# Patient Record
Sex: FEMALE | Race: WHITE | URBAN - NONMETROPOLITAN AREA
[De-identification: names, ages, dates, MRNs, and addresses within clinical notes are randomized per-mention and may not be internally consistent; named-entity substitution may affect disease eponyms.]

---

## 2020-11-05 ENCOUNTER — APPOINTMENT (RX ONLY)
Dept: URBAN - NONMETROPOLITAN AREA CLINIC 1 | Facility: CLINIC | Age: 35
Setting detail: DERMATOLOGY
End: 2020-11-05

## 2020-11-05 VITALS — DIASTOLIC BLOOD PRESSURE: 91 MMHG | SYSTOLIC BLOOD PRESSURE: 117 MMHG

## 2020-11-05 VITALS — TEMPERATURE: 98.3 F

## 2020-11-05 DIAGNOSIS — L70.8 OTHER ACNE: ICD-10-CM

## 2020-11-05 DIAGNOSIS — Z12.83 ENCOUNTER FOR SCREENING FOR MALIGNANT NEOPLASM OF SKIN: ICD-10-CM

## 2020-11-05 DIAGNOSIS — L57.8 OTHER SKIN CHANGES DUE TO CHRONIC EXPOSURE TO NONIONIZING RADIATION: ICD-10-CM

## 2020-11-05 DIAGNOSIS — Z41.9 ENCOUNTER FOR PROCEDURE FOR PURPOSES OTHER THAN REMEDYING HEALTH STATE, UNSPECIFIED: ICD-10-CM

## 2020-11-05 PROCEDURE — ? COSMETIC CONSULTATION: CHEMICAL PEELS

## 2020-11-05 PROCEDURE — ? PRESCRIPTION

## 2020-11-05 PROCEDURE — ? COUNSELING

## 2020-11-05 PROCEDURE — 99203 OFFICE O/P NEW LOW 30 MIN: CPT

## 2020-11-05 RX ORDER — SPIRONOLACTONE 25 MG/1
TABLET, FILM COATED ORAL QD
Qty: 60 | Refills: 3 | Status: ERX | COMMUNITY
Start: 2020-11-05

## 2020-11-05 RX ADMIN — SPIRONOLACTONE 1: 25 TABLET, FILM COATED ORAL at 00:00

## 2020-11-05 ASSESSMENT — LOCATION ZONE DERM
LOCATION ZONE: TRUNK
LOCATION ZONE: FACE

## 2020-11-05 ASSESSMENT — LOCATION DETAILED DESCRIPTION DERM
LOCATION DETAILED: RIGHT INFERIOR CENTRAL MALAR CHEEK
LOCATION DETAILED: RIGHT SUPERIOR MEDIAL UPPER BACK

## 2020-11-05 ASSESSMENT — LOCATION SIMPLE DESCRIPTION DERM
LOCATION SIMPLE: RIGHT UPPER BACK
LOCATION SIMPLE: RIGHT CHEEK

## 2020-11-05 NOTE — PROCEDURE: COUNSELING
Detail Level: Generalized
Spironolactone Counseling: Patient advised regarding risks of diarrhea, abdominal pain, hyperkalemia, birth defects (for female patients), liver toxicity and renal toxicity. The patient may need blood work to monitor liver and kidney function and potassium levels while on therapy. The patient verbalized understanding of the proper use and possible adverse effects of spironolactone.  All of the patient's questions and concerns were addressed.
High Dose Vitamin A Pregnancy And Lactation Text: High dose vitamin A therapy is contraindicated during pregnancy and breast feeding.
Tazorac Pregnancy And Lactation Text: This medication is not safe during pregnancy. It is unknown if this medication is excreted in breast milk.
Erythromycin Pregnancy And Lactation Text: This medication is Pregnancy Category B and is considered safe during pregnancy. It is also excreted in breast milk.
Dapsone Pregnancy And Lactation Text: This medication is Pregnancy Category C and is not considered safe during pregnancy or breast feeding.
Bactrim Counseling:  I discussed with the patient the risks of sulfa antibiotics including but not limited to GI upset, allergic reaction, drug rash, diarrhea, dizziness, photosensitivity, and yeast infections.  Rarely, more serious reactions can occur including but not limited to aplastic anemia, agranulocytosis, methemoglobinemia, blood dyscrasias, liver or kidney failure, lung infiltrates or desquamative/blistering drug rashes.
Minocycline Counseling: Patient advised regarding possible photosensitivity and discoloration of the teeth, skin, lips, tongue and gums.  Patient instructed to avoid sunlight, if possible.  When exposed to sunlight, patients should wear protective clothing, sunglasses, and sunscreen.  The patient was instructed to call the office immediately if the following severe adverse effects occur:  hearing changes, easy bruising/bleeding, severe headache, or vision changes.  The patient verbalized understanding of the proper use and possible adverse effects of minocycline.  All of the patient's questions and concerns were addressed.
Benzoyl Peroxide Pregnancy And Lactation Text: This medication is Pregnancy Category C. It is unknown if benzoyl peroxide is excreted in breast milk.
Isotretinoin Counseling: Patient should get monthly blood tests, not donate blood, not drive at night if vision affected, not share medication, and not undergo elective surgery for 6 months after tx completed. Side effects reviewed, pt to contact office should one occur.
Spironolactone Pregnancy And Lactation Text: This medication can cause feminization of the male fetus and should be avoided during pregnancy. The active metabolite is also found in breast milk.
Use Enhanced Medication Counseling?: No
Bactrim Pregnancy And Lactation Text: This medication is Pregnancy Category D and is known to cause fetal risk.  It is also excreted in breast milk.
Topical Clindamycin Counseling: Patient counseled that this medication may cause skin irritation or allergic reactions.  In the event of skin irritation, the patient was advised to reduce the amount of the drug applied or use it less frequently.   The patient verbalized understanding of the proper use and possible adverse effects of clindamycin.  All of the patient's questions and concerns were addressed.
Minocycline Pregnancy And Lactation Text: This medication is Pregnancy Category D and not consider safe during pregnancy. It is also excreted in breast milk.
Topical Retinoid counseling:  Patient advised to apply a pea-sized amount only at bedtime and wait 30 minutes after washing their face before applying.  If too drying, patient may add a non-comedogenic moisturizer. The patient verbalized understanding of the proper use and possible adverse effects of retinoids.  All of the patient's questions and concerns were addressed.
Doxycycline Counseling:  Patient counseled regarding possible photosensitivity and increased risk for sunburn.  Patient instructed to avoid sunlight, if possible.  When exposed to sunlight, patients should wear protective clothing, sunglasses, and sunscreen.  The patient was instructed to call the office immediately if the following severe adverse effects occur:  hearing changes, easy bruising/bleeding, severe headache, or vision changes.  The patient verbalized understanding of the proper use and possible adverse effects of doxycycline.  All of the patient's questions and concerns were addressed.
Birth Control Pills Counseling: Birth Control Pill Counseling: I discussed with the patient the potential side effects of OCPs including but not limited to increased risk of stroke, heart attack, thrombophlebitis, deep venous thrombosis, hepatic adenomas, breast changes, GI upset, headaches, and depression.  The patient verbalized understanding of the proper use and possible adverse effects of OCPs. All of the patient's questions and concerns were addressed.
Topical Clindamycin Pregnancy And Lactation Text: This medication is Pregnancy Category B and is considered safe during pregnancy. It is unknown if it is excreted in breast milk.
Detail Level: Zone
Isotretinoin Pregnancy And Lactation Text: This medication is Pregnancy Category X and is considered extremely dangerous during pregnancy. It is unknown if it is excreted in breast milk.
Azithromycin Counseling:  I discussed with the patient the risks of azithromycin including but not limited to GI upset, allergic reaction, drug rash, diarrhea, and yeast infections.
Topical Retinoid Pregnancy And Lactation Text: This medication is Pregnancy Category C. It is unknown if this medication is excreted in breast milk.
Tetracycline Counseling: Patient counseled regarding possible photosensitivity and increased risk for sunburn.  Patient instructed to avoid sunlight, if possible.  When exposed to sunlight, patients should wear protective clothing, sunglasses, and sunscreen.  The patient was instructed to call the office immediately if the following severe adverse effects occur:  hearing changes, easy bruising/bleeding, severe headache, or vision changes.  The patient verbalized understanding of the proper use and possible adverse effects of tetracycline.  All of the patient's questions and concerns were addressed. Patient understands to avoid pregnancy while on therapy due to potential birth defects.
Doxycycline Pregnancy And Lactation Text: This medication is Pregnancy Category D and not consider safe during pregnancy. It is also excreted in breast milk but is considered safe for shorter treatment courses.
Sarecycline Counseling: Patient advised regarding possible photosensitivity and discoloration of the teeth, skin, lips, tongue and gums.  Patient instructed to avoid sunlight, if possible.  When exposed to sunlight, patients should wear protective clothing, sunglasses, and sunscreen.  The patient was instructed to call the office immediately if the following severe adverse effects occur:  hearing changes, easy bruising/bleeding, severe headache, or vision changes.  The patient verbalized understanding of the proper use and possible adverse effects of sarecycline.  All of the patient's questions and concerns were addressed.
Topical Sulfur Applications Counseling: Topical Sulfur Counseling: Patient counseled that this medication may cause skin irritation or allergic reactions.  In the event of skin irritation, the patient was advised to reduce the amount of the drug applied or use it less frequently.   The patient verbalized understanding of the proper use and possible adverse effects of topical sulfur application.  All of the patient's questions and concerns were addressed.
High Dose Vitamin A Counseling: Side effects reviewed, pt to contact office should one occur.
Tazorac Counseling:  Patient advised that medication is irritating and drying.  Patient may need to apply sparingly and wash off after an hour before eventually leaving it on overnight.  The patient verbalized understanding of the proper use and possible adverse effects of tazorac.  All of the patient's questions and concerns were addressed.
Birth Control Pills Pregnancy And Lactation Text: This medication should be avoided if pregnant and for the first 30 days post-partum.
Azithromycin Pregnancy And Lactation Text: This medication is considered safe during pregnancy and is also secreted in breast milk.
Erythromycin Counseling:  I discussed with the patient the risks of erythromycin including but not limited to GI upset, allergic reaction, drug rash, diarrhea, increase in liver enzymes, and yeast infections.
Benzoyl Peroxide Counseling: Patient counseled that medicine may cause skin irritation and bleach clothing.  In the event of skin irritation, the patient was advised to reduce the amount of the drug applied or use it less frequently.   The patient verbalized understanding of the proper use and possible adverse effects of benzoyl peroxide.  All of the patient's questions and concerns were addressed.
Dapsone Counseling: I discussed with the patient the risks of dapsone including but not limited to hemolytic anemia, agranulocytosis, rashes, methemoglobinemia, kidney failure, peripheral neuropathy, headaches, GI upset, and liver toxicity.  Patients who start dapsone require monitoring including baseline LFTs and weekly CBCs for the first month, then every month thereafter.  The patient verbalized understanding of the proper use and possible adverse effects of dapsone.  All of the patient's questions and concerns were addressed.
Topical Sulfur Applications Pregnancy And Lactation Text: This medication is Pregnancy Category C and has an unknown safety profile during pregnancy. It is unknown if this topical medication is excreted in breast milk.
Patient Specific Counseling (Will Not Stick From Patient To Patient): Advised consultation with Brenna Carter RN.

## 2020-12-08 ENCOUNTER — APPOINTMENT (RX ONLY)
Dept: URBAN - NONMETROPOLITAN AREA CLINIC 1 | Facility: CLINIC | Age: 35
Setting detail: DERMATOLOGY
End: 2020-12-08

## 2020-12-08 VITALS — TEMPERATURE: 97.5 F

## 2020-12-08 DIAGNOSIS — Z41.9 ENCOUNTER FOR PROCEDURE FOR PURPOSES OTHER THAN REMEDYING HEALTH STATE, UNSPECIFIED: ICD-10-CM

## 2020-12-08 PROCEDURE — ? CHEMICAL PEEL

## 2020-12-08 ASSESSMENT — LOCATION DETAILED DESCRIPTION DERM: LOCATION DETAILED: GLABELLA

## 2020-12-08 ASSESSMENT — LOCATION SIMPLE DESCRIPTION DERM: LOCATION SIMPLE: GLABELLA

## 2020-12-08 ASSESSMENT — LOCATION ZONE DERM: LOCATION ZONE: FACE

## 2020-12-08 NOTE — PROCEDURE: CHEMICAL PEEL
Consent: Prior to the procedure, photos were taken, written consent was obtained, and RN reviewed the risks with the pt, including but not limited to: redness, peeling, blistering, pigmentary change, scarring, infection, and pain.  All pts questions and concerns addressed and pt verbalized understanding.
Number Of Layers: 4
Chemical Peel: Sensi Peel
Post Peel Care: After the procedure, Total strength, rebalance as final steps, pt refused sunscreen.  Pt provided after care kit and instructions and verbalized understanding.
Price (Use Numbers Only, No Special Characters Or $): 160
Comments: Pt swished with antiseptic mouthwash for 30 seconds prior to treatment.
Treatment Time (Optional): 45 mins
Detail Level: Zone
Prep: The treated area was degreased with oily/problem facial wash and smoothing toner. Pt verbalized sensitivity level of smoothing toner at 2 out of 10.
Post-Care Instructions: I reviewed with the patient in detail post-care instructions. Pt provided with home care kit with written instructions included. Patient should avoid sun exposure and wear sun protection.
Treatment Number: 1

## 2021-04-01 ENCOUNTER — APPOINTMENT (RX ONLY)
Dept: URBAN - NONMETROPOLITAN AREA CLINIC 1 | Facility: CLINIC | Age: 36
Setting detail: DERMATOLOGY
End: 2021-04-01

## 2021-04-01 VITALS — TEMPERATURE: 97.9 F

## 2021-04-01 DIAGNOSIS — Z41.9 ENCOUNTER FOR PROCEDURE FOR PURPOSES OTHER THAN REMEDYING HEALTH STATE, UNSPECIFIED: ICD-10-CM

## 2021-04-01 PROCEDURE — ? MEDICAL CONSULTATION: PRODUCTS

## 2021-04-01 PROCEDURE — ? CHEMICAL PEEL

## 2021-04-01 ASSESSMENT — LOCATION SIMPLE DESCRIPTION DERM: LOCATION SIMPLE: GLABELLA

## 2021-04-01 ASSESSMENT — LOCATION DETAILED DESCRIPTION DERM: LOCATION DETAILED: GLABELLA

## 2021-04-01 ASSESSMENT — LOCATION ZONE DERM: LOCATION ZONE: FACE

## 2021-04-01 NOTE — PROCEDURE: CHEMICAL PEEL
Consent: Prior to the procedure, photos were taken, written consent was obtained, and RN reviewed the risks with the pt, including but not limited to: redness, peeling, blistering, pigmentary change, scarring, infection, and pain.  All pts questions and concerns addressed and pt verbalized understanding.
Number Of Layers: 4
Chemical Peel: PCA Peel with Hydroquinone and Resourcinol
Post Peel Care: After the procedure, Total strength, HABS, HALB, ideal eye gel, advanced tx booster, rebalance as final steps, sheer tint SPF 45. Pt provided after care kit and instructions and verbalized understanding.
Price (Use Numbers Only, No Special Characters Or $): 160
Comments: Pt swished with antiseptic mouthwash for 30 seconds prior to treatment.
Treatment Time (Optional): 45 mins
Detail Level: Zone
Prep: The treated area was degreased with oily/problem facial wash and smoothing toner. Pt verbalized sensitivity level of smoothing toner at 2 out of 10.
Post-Care Instructions: I reviewed with the patient in detail post-care instructions. Pt provided with home care kit with written instructions included. Patient should avoid sun exposure and wear sun protection.
Treatment Number: 2

## 2021-06-01 ENCOUNTER — APPOINTMENT (RX ONLY)
Dept: URBAN - NONMETROPOLITAN AREA CLINIC 1 | Facility: CLINIC | Age: 36
Setting detail: DERMATOLOGY
End: 2021-06-01

## 2021-06-01 DIAGNOSIS — Z41.9 ENCOUNTER FOR PROCEDURE FOR PURPOSES OTHER THAN REMEDYING HEALTH STATE, UNSPECIFIED: ICD-10-CM

## 2021-06-01 PROCEDURE — ? CHEMICAL PEEL

## 2021-06-01 ASSESSMENT — LOCATION ZONE DERM: LOCATION ZONE: FACE

## 2021-06-01 ASSESSMENT — LOCATION DETAILED DESCRIPTION DERM: LOCATION DETAILED: GLABELLA

## 2021-06-01 ASSESSMENT — LOCATION SIMPLE DESCRIPTION DERM: LOCATION SIMPLE: GLABELLA

## 2021-06-01 NOTE — PROCEDURE: CHEMICAL PEEL
Consent: Prior to the procedure, photos were taken, written consent was obtained, and RN reviewed the risks with the pt, including but not limited to: redness, peeling, blistering, pigmentary change, scarring, infection, and pain.  All pts questions and concerns addressed and pt verbalized understanding.
Number Of Layers: 4
Chemical Peel: PCA Peel with Hydroquinone and Resourcinol
Post Peel Care: After the procedure, Ex linea, ILYA, HABS, HALB, ideal eye gel, advanced tx booster, rebalance as final steps, sheer tint SPF 45. Pt provided after care kit and instructions and verbalized understanding.
Price (Use Numbers Only, No Special Characters Or $): 160
Comments: Pt swished with antiseptic mouthwash for 30 seconds prior to treatment.
Treatment Time (Optional): 45 mins
Detail Level: Zone
Prep: The treated area was degreased with oily/problem facial wash and smoothing toner. Pt verbalized sensitivity level of smoothing toner at 2 out of 10.
Post-Care Instructions: I reviewed with the patient in detail post-care instructions. Pt provided with home care kit with written instructions included. Patient should avoid sun exposure and wear sun protection.
Treatment Number: 3

## 2021-09-21 ENCOUNTER — APPOINTMENT (RX ONLY)
Dept: URBAN - NONMETROPOLITAN AREA CLINIC 1 | Facility: CLINIC | Age: 36
Setting detail: DERMATOLOGY
End: 2021-09-21

## 2021-09-21 DIAGNOSIS — Z41.9 ENCOUNTER FOR PROCEDURE FOR PURPOSES OTHER THAN REMEDYING HEALTH STATE, UNSPECIFIED: ICD-10-CM

## 2021-09-21 PROCEDURE — ? CHEMICAL PEEL

## 2021-09-21 ASSESSMENT — LOCATION ZONE DERM: LOCATION ZONE: FACE

## 2021-09-21 ASSESSMENT — LOCATION DETAILED DESCRIPTION DERM: LOCATION DETAILED: GLABELLA

## 2021-09-21 ASSESSMENT — LOCATION SIMPLE DESCRIPTION DERM: LOCATION SIMPLE: GLABELLA

## 2021-09-21 NOTE — PROCEDURE: CHEMICAL PEEL
Consent: Prior to the procedure, photos were taken, written consent was obtained, and RN reviewed the risks with the pt, including but not limited to: redness, peeling, blistering, pigmentary change, scarring, infection, and pain.  All pts questions and concerns addressed and pt verbalized understanding.
Number Of Layers: 4
Chemical Peel: PCA Peel with Hydroquinone and Resourcinol
Post Peel Care: After the procedure, Ex linea, ILYA, HABS, HALB, ideal eye gel, advanced tx booster, rebalance as final steps, sheer tint SPF 45. Pt provided after care kit and instructions and verbalized understanding.
Price (Use Numbers Only, No Special Characters Or $): 160
Comments: Pt swished with antiseptic mouthwash for 30 seconds prior to treatment.
Treatment Time (Optional): 45 mins
Detail Level: Zone
Prep: The treated area was degreased with oily/problem facial wash and smoothing toner. Pt verbalized sensitivity level of smoothing toner at 2 out of 10.
Post-Care Instructions: I reviewed with the patient in detail post-care instructions. Pt provided with home care kit with written instructions included. Patient should avoid sun exposure and wear sun protection.

## 2021-12-16 ENCOUNTER — APPOINTMENT (RX ONLY)
Dept: URBAN - NONMETROPOLITAN AREA CLINIC 1 | Facility: CLINIC | Age: 36
Setting detail: DERMATOLOGY
End: 2021-12-16

## 2021-12-16 DIAGNOSIS — Z41.9 ENCOUNTER FOR PROCEDURE FOR PURPOSES OTHER THAN REMEDYING HEALTH STATE, UNSPECIFIED: ICD-10-CM

## 2021-12-16 PROCEDURE — ? CHEMICAL PEEL

## 2021-12-16 ASSESSMENT — LOCATION SIMPLE DESCRIPTION DERM: LOCATION SIMPLE: GLABELLA

## 2021-12-16 ASSESSMENT — LOCATION DETAILED DESCRIPTION DERM: LOCATION DETAILED: GLABELLA

## 2021-12-16 ASSESSMENT — LOCATION ZONE DERM: LOCATION ZONE: FACE

## 2021-12-16 NOTE — PROCEDURE: CHEMICAL PEEL
Consent: Prior to the procedure, photos were taken, written consent was obtained, and RN reviewed the risks with the pt, including but not limited to: redness, peeling, blistering, pigmentary change, scarring, infection, and pain.  All pts questions and concerns addressed and pt verbalized understanding.
Number Of Layers: 4
Chemical Peel: PCA Peel with Hydroquinone and Resourcinol
Post Peel Care: After the procedure, CALLY CARABALLO, KILLIAN, ideal eye gel, advanced treatment booster, rebalance as final steps, sheer tint SPF 45. Pt provided after care kit and instructions and verbalized understanding.
Price (Use Numbers Only, No Special Characters Or $): 160
Treatment Time (Optional): 45 mins
Detail Level: Zone
Prep: The treated area was degreased with oily/problem facial wash and smoothing toner. Pt verbalized sensitivity level of smoothing toner at 2 out of 10.
Post-Care Instructions: I reviewed with the patient in detail post-care instructions. Pt provided with home care kit with written instructions included. Patient should avoid sun exposure and wear sun protection.
Treatment Number: 5

## 2022-03-17 ENCOUNTER — APPOINTMENT (RX ONLY)
Dept: URBAN - NONMETROPOLITAN AREA CLINIC 1 | Facility: CLINIC | Age: 37
Setting detail: DERMATOLOGY
End: 2022-03-17

## 2022-03-17 DIAGNOSIS — Z41.9 ENCOUNTER FOR PROCEDURE FOR PURPOSES OTHER THAN REMEDYING HEALTH STATE, UNSPECIFIED: ICD-10-CM

## 2022-03-17 PROCEDURE — ? CHEMICAL PEEL

## 2022-03-17 ASSESSMENT — LOCATION DETAILED DESCRIPTION DERM: LOCATION DETAILED: GLABELLA

## 2022-03-17 ASSESSMENT — LOCATION ZONE DERM: LOCATION ZONE: FACE

## 2022-03-17 ASSESSMENT — LOCATION SIMPLE DESCRIPTION DERM: LOCATION SIMPLE: GLABELLA

## 2022-03-17 NOTE — PROCEDURE: CHEMICAL PEEL
Consent: Prior to the procedure, photos were taken, written consent was obtained, and RN reviewed the risks with the pt, including but not limited to: redness, peeling, blistering, pigmentary change, scarring, infection, and pain.  All pts questions and concerns addressed and pt verbalized understanding.
Number Of Layers: 2
Chemical Peel: PCA Peel with Hydroquinone and Resourcinol
Post Peel Care: After the procedure, CALLY CARABALLO, KILLIAN, ideal eye gel, advanced treatment booster, rebalance as final steps, sheer tint SPF 45. Pt provided after care kit and instructions and verbalized understanding.
Price (Use Numbers Only, No Special Characters Or $): 160
Treatment Time (Optional): 45 mins
Detail Level: Zone
Prep: The treated area was degreased with oily/problem facial wash and smoothing toner. Pt verbalized sensitivity level of smoothing toner at 2 out of 10.
Post-Care Instructions: I reviewed with the patient in detail post-care instructions. Pt provided with home care kit with written instructions included. Patient should avoid sun exposure and wear sun protection.

## 2022-05-19 ENCOUNTER — APPOINTMENT (RX ONLY)
Dept: URBAN - NONMETROPOLITAN AREA CLINIC 1 | Facility: CLINIC | Age: 37
Setting detail: DERMATOLOGY
End: 2022-05-19

## 2022-05-19 DIAGNOSIS — Z41.9 ENCOUNTER FOR PROCEDURE FOR PURPOSES OTHER THAN REMEDYING HEALTH STATE, UNSPECIFIED: ICD-10-CM

## 2022-05-19 PROCEDURE — ? CHEMICAL PEEL

## 2022-05-19 ASSESSMENT — LOCATION DETAILED DESCRIPTION DERM: LOCATION DETAILED: GLABELLA

## 2022-05-19 ASSESSMENT — LOCATION SIMPLE DESCRIPTION DERM: LOCATION SIMPLE: GLABELLA

## 2022-05-19 ASSESSMENT — LOCATION ZONE DERM: LOCATION ZONE: FACE

## 2022-05-19 NOTE — PROCEDURE: CHEMICAL PEEL
Consent: Prior to the procedure, photos were taken, written consent was obtained, and RN reviewed the risks with the pt, including but not limited to: redness, peeling, blistering, pigmentary change, scarring, infection, and pain.  All pts questions and concerns addressed and pt verbalized understanding.
Number Of Layers: 4
Chemical Peel: PCA Peel with Hydroquinone and Resourcinol
Post Peel Care: After the procedure HABS, HALB, ideal eye gel, advanced treatment booster, rebalance as final steps, sheer tint SPF 45. Pt provided after care kit and instructions and verbalized understanding.
Price (Use Numbers Only, No Special Characters Or $): 160
Treatment Time (Optional): 45 mins
Detail Level: Zone
Prep: The treated area was degreased with oily/problem facial wash and smoothing toner. Pt verbalized sensitivity level of smoothing toner at 2 out of 10.
Post-Care Instructions: I reviewed with the patient in detail post-care instructions. Pt provided with home care kit with written instructions included. Patient should avoid sun exposure and wear sun protection.
Treatment Number: 7

## 2022-09-15 ENCOUNTER — APPOINTMENT (RX ONLY)
Dept: URBAN - NONMETROPOLITAN AREA CLINIC 1 | Facility: CLINIC | Age: 37
Setting detail: DERMATOLOGY
End: 2022-09-15

## 2022-09-15 DIAGNOSIS — Z41.9 ENCOUNTER FOR PROCEDURE FOR PURPOSES OTHER THAN REMEDYING HEALTH STATE, UNSPECIFIED: ICD-10-CM

## 2022-09-15 PROCEDURE — ? CHEMICAL PEEL

## 2022-09-15 ASSESSMENT — LOCATION DETAILED DESCRIPTION DERM: LOCATION DETAILED: GLABELLA

## 2022-09-15 ASSESSMENT — LOCATION SIMPLE DESCRIPTION DERM: LOCATION SIMPLE: GLABELLA

## 2022-09-15 ASSESSMENT — LOCATION ZONE DERM: LOCATION ZONE: FACE

## 2022-09-15 NOTE — PROCEDURE: CHEMICAL PEEL
Consent: Prior to the procedure, photos were taken, written consent was obtained, and RN reviewed the risks with the pt, including but not limited to: redness, peeling, blistering, pigmentary change, scarring, infection, and pain.  All pts questions and concerns addressed and pt verbalized understanding.
Number Of Layers: 3
Chemical Peel: Ultra Peel I
Post Peel Care: After the procedure HABS, HALB, ideal eye gel, advanced treatment booster, rebalance as final steps, sheer tint SPF 45. Pt provided after care kit and instructions and verbalized understanding.
Price (Use Numbers Only, No Special Characters Or $): 160
Treatment Time (Optional): 45 mins
Detail Level: Zone
Prep: The treated area was degreased with oily/problem facial wash and smoothing toner. Pt verbalized sensitivity level of smoothing toner at 2 out of 10.
Post-Care Instructions: I reviewed with the patient in detail post-care instructions. Pt provided with home care kit with written instructions included. Patient should avoid sun exposure and wear sun protection.
Treatment Number: 8

## 2022-12-13 ENCOUNTER — APPOINTMENT (RX ONLY)
Dept: URBAN - NONMETROPOLITAN AREA CLINIC 1 | Facility: CLINIC | Age: 37
Setting detail: DERMATOLOGY
End: 2022-12-13

## 2022-12-13 DIAGNOSIS — L90.5 SCAR CONDITIONS AND FIBROSIS OF SKIN: ICD-10-CM

## 2022-12-13 PROCEDURE — ? MICRONEEDLING

## 2022-12-13 NOTE — PROCEDURE: MICRONEEDLING
Location #1: Cheeks/Chin/Upper lip
Speed (Location #1): high
Depth In Mm (Location #1): 2.5
Location #2: Forehead/Nose/under eye
Topical Anesthesia?: 23% lidocaine, 7% tetracaine
Post-Care Instructions: Written copy of post care instructions provided to pt, reviewed by RN, and signed by pt.  See attached photo for complete instructions.\\n\\nPt instructed to protect from the sun with a broad spectrum, physical sunscreen and avoid direct sun exposure if possible during the healing period. Apply gentle moisturizer to treated areas, especially at night for at least 4 to 7 days post tx. Do not scrub, exfoliate, or pick at the skin during the healing process and avoid topical retinols until healed. \\n\\Cezar pt questions and concerns addressed and pt verbalized understanding.
Yes
Consent: Pt swished with antiseptic mouthwash for 30 seconds prior to tx. \\n\\nWritten consent reviewed by RN and signed by pt.  Risks reviewed including but not limited to pain, swelling, scarring, infection, and incomplete improvement. \\n
Price (Use Numbers Only, No Special Characters Or $): 300
Length Of Topical Anesthesia Application (Optional): 15 minutes
Depth In Mm (Location #2): 1
Treatment Number (Optional): 0
Detail Level: Zone

## 2023-01-19 ENCOUNTER — APPOINTMENT (RX ONLY)
Dept: URBAN - NONMETROPOLITAN AREA CLINIC 1 | Facility: CLINIC | Age: 38
Setting detail: DERMATOLOGY
End: 2023-01-19

## 2023-01-19 DIAGNOSIS — L90.5 SCAR CONDITIONS AND FIBROSIS OF SKIN: ICD-10-CM

## 2023-01-19 PROCEDURE — ? MICRONEEDLING

## 2023-01-19 NOTE — PROCEDURE: MICRONEEDLING
Location #1: Cheeks/Chin/Upper lip/neck
Speed (Location #1): high
Depth In Mm (Location #1): 2
Location #2: Forehead/Nose/under eye
Topical Anesthesia?: 23% lidocaine, 7% tetracaine
Post-Care Instructions: Written copy of post care instructions provided to pt, reviewed by RN, and signed by pt.  See attached photo for complete instructions.\\n\\nPt instructed to protect from the sun with a broad spectrum, physical sunscreen and avoid direct sun exposure if possible during the healing period. Apply gentle moisturizer to treated areas, especially at night for at least 4 to 7 days post tx. Do not scrub, exfoliate, or pick at the skin during the healing process and avoid topical retinols until healed. \\n\\Cezar pt questions and concerns addressed and pt verbalized understanding.
Consent: Pt swished with antiseptic mouthwash for 30 seconds prior to tx. \\n\\nWritten consent reviewed by RN and signed by pt.  Risks reviewed including but not limited to pain, swelling, scarring, infection, and incomplete improvement.
Price (Use Numbers Only, No Special Characters Or $): 691
Length Of Topical Anesthesia Application (Optional): 15 minutes
Depth In Mm (Location #2): 1
Detail Level: Zone

## 2023-03-08 ENCOUNTER — APPOINTMENT (RX ONLY)
Dept: URBAN - NONMETROPOLITAN AREA CLINIC 1 | Facility: CLINIC | Age: 38
Setting detail: DERMATOLOGY
End: 2023-03-08

## 2023-03-08 DIAGNOSIS — L90.5 SCAR CONDITIONS AND FIBROSIS OF SKIN: ICD-10-CM

## 2023-03-08 PROCEDURE — ? MICRONEEDLING

## 2023-03-08 NOTE — PROCEDURE: MICRONEEDLING
Depth In Mm (Location #1): 2.5
Treatment Number (Optional): 3
Speed (Location #2): high
Location #1: Cheeks/Chin/Upper lip
Topical Anesthesia?: 23% lidocaine, 7% tetracaine
Price (Use Numbers Only, No Special Characters Or $): 315
Length Of Topical Anesthesia Application (Optional): 15 minutes
Location #3: under eye
Location #4: neck
Detail Level: Zone
Depth In Mm (Location #4): 2
Consent: Pt swished with antiseptic mouthwash for 30 seconds prior to tx. \\n\\nWritten consent reviewed by RN and signed by pt.  Risks reviewed including but not limited to pain, swelling, scarring, infection, and incomplete improvement.
Post-Care Instructions: Written copy of post care instructions provided to pt, reviewed by RN, and signed by pt.  See attached photo for complete instructions.\\n\\nPt instructed to protect from the sun with a broad spectrum, physical sunscreen and avoid direct sun exposure if possible during the healing period. Apply gentle moisturizer to treated areas, especially at night for at least 4 to 7 days post tx. Do not scrub, exfoliate, or pick at the skin during the healing process and avoid topical retinols until healed. \\n\\Cezar pt questions and concerns addressed and pt verbalized understanding.
Location #2: Forehead/Nose
Depth In Mm (Location #3): 0.5
Depth In Mm (Location #2): 1
Depth In Mm (Location #7): 0.1

## 2023-04-28 ENCOUNTER — APPOINTMENT (RX ONLY)
Dept: URBAN - NONMETROPOLITAN AREA CLINIC 1 | Facility: CLINIC | Age: 38
Setting detail: DERMATOLOGY
End: 2023-04-28

## 2023-04-28 DIAGNOSIS — L01.01 NON-BULLOUS IMPETIGO: ICD-10-CM

## 2023-04-28 PROCEDURE — ? ADDITIONAL NOTES

## 2023-04-28 PROCEDURE — ? COUNSELING

## 2023-04-28 NOTE — PROCEDURE: ADDITIONAL NOTES
Additional Notes: Dr. miguel consulted with this pt about the lesion on her L oral commissure.  The  Recommended topical mupirocin 3 times per day.  If the lesion does not resolve in 5 days the pt should contact the office for a focused exam.  Pt will postpone the chemical peel until healed.
Render Risk Assessment In Note?: no
Detail Level: Simple

## 2024-01-10 NOTE — HPI: EVALUATION OF SKIN LESION(S)
Hpi Title: Evaluation of Skin Lesions
If the pain returns, have her notify me. For now we will proceed with observation.    Dr. Caron Aguilar  [facility] [address], [address]  [place] [address]  Phone: [phone]  Fax: [phone]    
Year Removed: 1900